# Patient Record
Sex: MALE | Race: WHITE | Employment: FULL TIME | URBAN - NONMETROPOLITAN AREA
[De-identification: names, ages, dates, MRNs, and addresses within clinical notes are randomized per-mention and may not be internally consistent; named-entity substitution may affect disease eponyms.]

---

## 2022-12-05 ENCOUNTER — OFFICE VISIT (OUTPATIENT)
Dept: PRIMARY CARE CLINIC | Age: 27
End: 2022-12-05

## 2022-12-05 VITALS
RESPIRATION RATE: 16 BRPM | BODY MASS INDEX: 22.01 KG/M2 | OXYGEN SATURATION: 97 % | HEART RATE: 67 BPM | SYSTOLIC BLOOD PRESSURE: 118 MMHG | HEIGHT: 75 IN | DIASTOLIC BLOOD PRESSURE: 72 MMHG | WEIGHT: 177 LBS

## 2022-12-05 DIAGNOSIS — S05.02XA ABRASION OF LEFT CORNEA, INITIAL ENCOUNTER: Primary | ICD-10-CM

## 2022-12-05 RX ORDER — PROPARACAINE HYDROCHLORIDE 5 MG/ML
1 SOLUTION/ DROPS OPHTHALMIC ONCE
Status: SHIPPED | OUTPATIENT
Start: 2022-12-05

## 2022-12-05 RX ORDER — POLYVINYL ALCOHOL 14 MG/ML
1 SOLUTION/ DROPS OPHTHALMIC ONCE
Status: DISCONTINUED | OUTPATIENT
Start: 2022-12-05 | End: 2022-12-05

## 2022-12-05 ASSESSMENT — ENCOUNTER SYMPTOMS
ALLERGIC/IMMUNOLOGIC NEGATIVE: 1
EYE DISCHARGE: 0
EYE ITCHING: 0
PHOTOPHOBIA: 0
EYE REDNESS: 1
RESPIRATORY NEGATIVE: 1

## 2022-12-05 NOTE — PROGRESS NOTES
Abebe Sullivan (:  1995) is a 32 y.o. male,New patient, here for evaluation of the following chief complaint(s):  Eye Injury (Here for possible metal in left eye, states he flushed it out but still feels some irritation, does not wish to do workers comp )         ASSESSMENT/PLAN:  1. Abrasion of left cornea, initial encounter  Procedure:   Ophthalmologic examination completed. Proparacaine 1gtt was applied to left eye and 3-4 gtts of eye wash solution was applied to left eye prior to placing a 1 % fluorescein strip without applying pressure on the left eye. The fluorescein is examined with a woods lamp/slit lamp exam performed, cornea and conjunctiva with fluorescein examined; corneal abrasion observed in left eye. Recommended 24-h ophthalmologic follow-up and advised to seek emergency care if symptoms have not completely resolved or worsen in 24 h. Prescription for antibx ointment. POC:  Educated on the importance of wearing safety goggles while working and on the risks of rubbing the eyes with/without foreign body   Prescription for erythromycin ointment to left eye  Referral to eye doctor to follow-up on corneal abrasion or f/u with emergency care     No follow-ups on file. Subjective   SUBJECTIVE/OBJECTIVE:  Reports left eye irritation d/t metal in eye that occurred while at work and on work duty. Reports he rubbed and irrigated his left eye with his fingers attempting to remove the piece of metal. Reports he believes his supervisor removed a metal piece from left eye. Reports he remains with left eye irritation. Review of Systems   Constitutional: Negative. HENT: Negative. Eyes:  Positive for redness. Negative for photophobia, discharge, itching and visual disturbance. Reports eye irritation to left eye    Respiratory: Negative. Cardiovascular: Negative. Allergic/Immunologic: Negative. Neurological: Negative. Psychiatric/Behavioral: Negative. Objective   Physical Exam  Vitals and nursing note reviewed. Eyes:      General: Lids are everted, no foreign bodies appreciated. Extraocular Movements: Extraocular movements intact. Conjunctiva/sclera:      Left eye: Left conjunctiva is injected. Pupils: Pupils are equal, round, and reactive to light. Left eye: Corneal abrasion (small abrasion approximately 1.5mm at the 9 oclock position visualized with fluorescein and woods lamp) present. An electronic signature was used to authenticate this note.     --Litzy Kaminski